# Patient Record
Sex: FEMALE | Race: WHITE | NOT HISPANIC OR LATINO | Employment: UNEMPLOYED | URBAN - METROPOLITAN AREA
[De-identification: names, ages, dates, MRNs, and addresses within clinical notes are randomized per-mention and may not be internally consistent; named-entity substitution may affect disease eponyms.]

---

## 2019-11-21 PROBLEM — S12.9XXD: Status: ACTIVE | Noted: 2019-10-08

## 2019-11-21 PROBLEM — S52.032A CLOSED DISPLACED FRACTURE OF OLECRANON PROCESS WITH INTRAARTICULAR EXTENSION OF LEFT ULNA: Status: ACTIVE | Noted: 2019-10-08

## 2019-11-21 PROBLEM — S42.452A CLOSED DISPLACED FRACTURE OF LATERAL CONDYLE OF LEFT HUMERUS: Status: ACTIVE | Noted: 2019-10-08

## 2019-11-21 PROBLEM — S82.873A CLOSED PILON FRACTURE OF TIBIA: Status: ACTIVE | Noted: 2019-10-08

## 2019-11-21 PROBLEM — S53.115A: Status: ACTIVE | Noted: 2019-10-08
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
## 2019-11-21 PROBLEM — S82.831A CLOSED FRACTURE OF DISTAL END OF RIGHT FIBULA, INITIAL ENCOUNTER: Status: ACTIVE | Noted: 2019-10-08
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
## 2019-11-21 PROBLEM — S32.462A: Status: ACTIVE | Noted: 2019-10-08

## 2019-11-21 PROBLEM — S12.9XXA CERVICAL TRANSVERSE PROCESS FRACTURE, INITIAL ENCOUNTER: Status: ACTIVE | Noted: 2019-10-08

## 2020-06-25 PROBLEM — Z01.818 PRE-OP TESTING: Status: ACTIVE | Noted: 2020-06-25

## 2020-06-30 PROBLEM — S82.871K: Status: ACTIVE | Noted: 2020-06-30

## 2020-07-13 ENCOUNTER — NURSE TRIAGE (OUTPATIENT)
Dept: ADMINISTRATIVE | Facility: CLINIC | Age: 40
End: 2020-07-13

## 2020-07-13 NOTE — TELEPHONE ENCOUNTER
Attempted to contact patient on behalf of [facility]'s post procedure system tracker  No answer  No contact

## 2020-07-21 PROBLEM — S32.402G: Status: ACTIVE | Noted: 2020-07-21

## 2020-09-17 PROBLEM — S42.402A LEFT ELBOW FRACTURE, CLOSED, INITIAL ENCOUNTER: Status: ACTIVE | Noted: 2019-10-08

## 2020-09-17 PROBLEM — S12.9XXD: Status: RESOLVED | Noted: 2019-10-08 | Resolved: 2020-09-17

## 2020-09-17 PROBLEM — S52.032A CLOSED DISPLACED FRACTURE OF OLECRANON PROCESS WITH INTRAARTICULAR EXTENSION OF LEFT ULNA: Status: RESOLVED | Noted: 2019-10-08 | Resolved: 2020-09-17

## 2020-09-17 PROBLEM — F19.10 POLYSUBSTANCE ABUSE: Status: ACTIVE | Noted: 2020-09-17

## 2020-09-17 PROBLEM — F15.90 STIMULANT USE DISORDER: Status: ACTIVE | Noted: 2017-05-11

## 2020-09-17 PROBLEM — D62 ACUTE BLOOD LOSS ANEMIA: Status: ACTIVE | Noted: 2019-10-30

## 2020-09-17 PROBLEM — F10.90 ALCOHOL USE DISORDER: Status: ACTIVE | Noted: 2017-05-11

## 2020-09-17 PROBLEM — F11.10 OPIATE ABUSE, CONTINUOUS: Status: ACTIVE | Noted: 2017-05-11

## 2020-09-17 PROBLEM — F17.200 TOBACCO USE DISORDER, MODERATE, DEPENDENCE: Status: ACTIVE | Noted: 2017-05-11

## 2020-09-17 PROBLEM — S53.115A: Status: RESOLVED | Noted: 2019-10-08 | Resolved: 2020-09-17

## 2020-09-17 PROBLEM — G40.909 SEIZURE DISORDER: Status: ACTIVE | Noted: 2017-05-11

## 2020-09-17 PROBLEM — S42.452A CLOSED DISPLACED FRACTURE OF LATERAL CONDYLE OF LEFT HUMERUS: Status: RESOLVED | Noted: 2019-10-08 | Resolved: 2020-09-17

## 2020-09-17 PROBLEM — F12.20 CANNABIS USE DISORDER, MODERATE, DEPENDENCE: Status: ACTIVE | Noted: 2017-05-11

## 2020-10-27 PROBLEM — M87.052 AVASCULAR NECROSIS OF BONE OF HIP, LEFT: Status: ACTIVE | Noted: 2020-10-27

## 2020-11-30 PROBLEM — S81.801A OPEN WOUND OF RIGHT LOWER LEG: Status: ACTIVE | Noted: 2020-11-30

## 2020-12-01 PROBLEM — M79.604 RIGHT LEG PAIN: Status: ACTIVE | Noted: 2020-12-01

## 2020-12-03 PROBLEM — F31.2 BIPOLAR I DISORDER, CURRENT OR MOST RECENT EPISODE MANIC, WITH PSYCHOTIC FEATURES: Status: ACTIVE | Noted: 2020-12-03

## 2020-12-03 PROBLEM — F19.94 SUBSTANCE INDUCED MOOD DISORDER: Status: ACTIVE | Noted: 2020-12-03

## 2021-02-04 PROBLEM — S81.801D UNSPECIFIED OPEN WOUND, RIGHT LOWER LEG, SUBSEQUENT ENCOUNTER: Status: ACTIVE | Noted: 2020-11-30

## 2021-04-06 PROBLEM — S82.873A PILON FRACTURE: Status: ACTIVE | Noted: 2021-04-06

## 2021-05-21 PROBLEM — G89.18 POST-OP PAIN: Status: ACTIVE | Noted: 2021-05-21

## 2021-05-25 PROBLEM — L03.115 CELLULITIS OF RIGHT LOWER EXTREMITY: Status: ACTIVE | Noted: 2021-05-25

## 2021-08-23 PROBLEM — G89.18 POST-OP PAIN: Status: RESOLVED | Noted: 2021-05-21 | Resolved: 2021-08-23

## 2022-02-25 PROBLEM — M16.52 POST-TRAUMATIC OSTEOARTHRITIS OF LEFT HIP: Status: ACTIVE | Noted: 2022-02-25

## 2022-02-25 PROBLEM — Z47.89 ORTHOPEDIC AFTERCARE: Status: ACTIVE | Noted: 2022-02-25

## 2022-02-25 PROBLEM — Z89.511 HISTORY OF RIGHT BELOW KNEE AMPUTATION: Status: ACTIVE | Noted: 2022-02-25

## 2023-03-13 PROBLEM — M25.552 CHRONIC LEFT HIP PAIN: Status: ACTIVE | Noted: 2023-03-13

## 2023-03-13 PROBLEM — G89.29 CHRONIC LEFT HIP PAIN: Status: ACTIVE | Noted: 2023-03-13

## 2023-12-28 PROBLEM — F90.9 ADHD: Status: ACTIVE | Noted: 2023-12-28

## 2023-12-28 PROBLEM — G43.909 MIGRAINES: Status: ACTIVE | Noted: 2023-12-28

## 2023-12-28 PROBLEM — Z96.642 S/P TOTAL LEFT HIP ARTHROPLASTY: Status: ACTIVE | Noted: 2023-12-28

## 2023-12-28 PROBLEM — M79.2 NEUROPATHIC PAIN: Status: ACTIVE | Noted: 2023-12-28

## 2023-12-29 PROBLEM — R11.0 NAUSEA: Status: ACTIVE | Noted: 2023-12-29
